# Patient Record
Sex: MALE | Race: BLACK OR AFRICAN AMERICAN
[De-identification: names, ages, dates, MRNs, and addresses within clinical notes are randomized per-mention and may not be internally consistent; named-entity substitution may affect disease eponyms.]

---

## 2020-05-10 ENCOUNTER — HOSPITAL ENCOUNTER (EMERGENCY)
Dept: HOSPITAL 88 - FSED | Age: 35
Discharge: ANOTHER HOSPITAL | End: 2020-05-10
Payer: SELF-PAY

## 2020-05-10 VITALS — WEIGHT: 170 LBS | HEIGHT: 72 IN | BODY MASS INDEX: 23.03 KG/M2

## 2020-05-10 VITALS — SYSTOLIC BLOOD PRESSURE: 126 MMHG | DIASTOLIC BLOOD PRESSURE: 89 MMHG
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
## 2020-05-10 DIAGNOSIS — S62.661B: Primary | ICD-10-CM

## 2020-05-10 PROCEDURE — 90714 TD VACC NO PRESV 7 YRS+ IM: CPT

## 2020-05-10 PROCEDURE — 13132 CMPLX RPR F/C/C/M/N/AX/G/H/F: CPT

## 2020-05-10 PROCEDURE — 96372 THER/PROPH/DIAG INJ SC/IM: CPT

## 2020-05-10 PROCEDURE — 99283 EMERGENCY DEPT VISIT LOW MDM: CPT

## 2020-05-10 PROCEDURE — 12001 RPR S/N/AX/GEN/TRNK 2.5CM/<: CPT

## 2020-05-10 PROCEDURE — 90471 IMMUNIZATION ADMIN: CPT

## 2020-05-10 NOTE — NUR
LAC TRAY SET UP WITH MD AT BEDSIDE. MD APPLIED PRESSURE DRESSING WITH STERIL 
4X4S AND COBAN TO L-MIDDLE FINGER. MD TO DUE NERVE BLOCK

## 2020-05-10 NOTE — NUR
ORIGINAL MOT, FRONT SHEET, DOCTORS T-SHEET, NURSES DC SUMMARY GIVEN TO PT'S 
SISTER TO GIVE TO PERSONEL AT [facility].

## 2020-05-10 NOTE — NUR
MD EXPLAINED TO PT AND HIS SISTER OF NEED TO TRANSFER FOR HAND SPECIALIST. PT 
CHOSE [facility]. BOTH PT AND SISTER REFUSED TO BE TRANSPORTED BY EMS. SISTER 
STATED [facility] AS THEY COULD NOT AFFORD 
AMBULANCE. PT WAS ADAMANT THAT HE WAS NOT GOING TO GO BY AMBULANCE.